# Patient Record
Sex: FEMALE | Race: BLACK OR AFRICAN AMERICAN
[De-identification: names, ages, dates, MRNs, and addresses within clinical notes are randomized per-mention and may not be internally consistent; named-entity substitution may affect disease eponyms.]

---

## 2019-07-26 ENCOUNTER — HOSPITAL ENCOUNTER (EMERGENCY)
Dept: HOSPITAL 54 - ER | Age: 34
Discharge: HOME | End: 2019-07-26
Payer: SELF-PAY

## 2019-07-26 VITALS — SYSTOLIC BLOOD PRESSURE: 133 MMHG | DIASTOLIC BLOOD PRESSURE: 85 MMHG

## 2019-07-26 VITALS — WEIGHT: 135 LBS | HEIGHT: 67 IN | BODY MASS INDEX: 21.19 KG/M2

## 2019-07-26 DIAGNOSIS — R41.82: ICD-10-CM

## 2019-07-26 DIAGNOSIS — F12.10: Primary | ICD-10-CM

## 2019-07-26 LAB
ALBUMIN SERPL BCP-MCNC: 3.9 G/DL (ref 3.4–5)
ALP SERPL-CCNC: 61 U/L (ref 46–116)
ALT SERPL W P-5'-P-CCNC: 12 U/L (ref 12–78)
APAP SERPL-MCNC: 2 UG/ML (ref 10–30)
AST SERPL W P-5'-P-CCNC: 10 U/L (ref 15–37)
BASOPHILS # BLD AUTO: 0.1 /CMM (ref 0–0.2)
BASOPHILS NFR BLD AUTO: 0.4 % (ref 0–2)
BILIRUB DIRECT SERPL-MCNC: 0.3 MG/DL (ref 0–0.2)
BILIRUB SERPL-MCNC: 1.1 MG/DL (ref 0.2–1)
BUN SERPL-MCNC: 6 MG/DL (ref 7–18)
CALCIUM SERPL-MCNC: 8.5 MG/DL (ref 8.5–10.1)
CHLORIDE SERPL-SCNC: 99 MMOL/L (ref 98–107)
CO2 SERPL-SCNC: 20 MMOL/L (ref 21–32)
CREAT SERPL-MCNC: 0.9 MG/DL (ref 0.6–1.3)
EOSINOPHIL NFR BLD AUTO: 1.5 % (ref 0–6)
ETHANOL SERPL-MCNC: < 3 MG/DL (ref 0–0)
GLUCOSE SERPL-MCNC: 171 MG/DL (ref 74–106)
HCT VFR BLD AUTO: 42 % (ref 33–45)
HGB BLD-MCNC: 14.5 G/DL (ref 11.5–14.8)
KETONES UR STRIP-MCNC: >=160 MG/DL
LYMPHOCYTES NFR BLD AUTO: 19.2 % (ref 20–44)
LYMPHOCYTES NFR BLD AUTO: 2.9 /CMM (ref 0.8–4.8)
MCHC RBC AUTO-ENTMCNC: 34 G/DL (ref 31–36)
MCV RBC AUTO: 86 FL (ref 82–100)
MONOCYTES NFR BLD AUTO: 0.9 /CMM (ref 0.1–1.3)
MONOCYTES NFR BLD AUTO: 5.9 % (ref 2–12)
NEUTROPHILS # BLD AUTO: 10.9 /CMM (ref 1.8–8.9)
NEUTROPHILS NFR BLD AUTO: 73 % (ref 43–81)
PH UR STRIP: 7 [PH] (ref 5–8)
PLATELET # BLD AUTO: 224 /CMM (ref 150–450)
POTASSIUM SERPL-SCNC: 3.1 MMOL/L (ref 3.5–5.1)
PROT SERPL-MCNC: 7.1 G/DL (ref 6.4–8.2)
RBC # BLD AUTO: 4.94 MIL/UL (ref 4–5.2)
RBC #/AREA URNS HPF: (no result) /HPF (ref 0–2)
SALICYLATES SERPL-MCNC: 0.6 MG/DL (ref 2.8–20)
SODIUM SERPL-SCNC: 134 MMOL/L (ref 136–145)
UROBILINOGEN UR STRIP-MCNC: 0.2 EU/DL
WBC #/AREA URNS HPF: (no result) /HPF (ref 0–3)
WBC NRBC COR # BLD AUTO: 14.9 K/UL (ref 4.3–11)

## 2019-07-26 PROCEDURE — 96374 THER/PROPH/DIAG INJ IV PUSH: CPT

## 2019-07-26 PROCEDURE — 80329 ANALGESICS NON-OPIOID 1 OR 2: CPT

## 2019-07-26 PROCEDURE — 96361 HYDRATE IV INFUSION ADD-ON: CPT

## 2019-07-26 PROCEDURE — G0480 DRUG TEST DEF 1-7 CLASSES: HCPCS

## 2019-07-26 PROCEDURE — 99284 EMERGENCY DEPT VISIT MOD MDM: CPT

## 2019-07-26 PROCEDURE — 80048 BASIC METABOLIC PNL TOTAL CA: CPT

## 2019-07-26 PROCEDURE — 81001 URINALYSIS AUTO W/SCOPE: CPT

## 2019-07-26 PROCEDURE — 87086 URINE CULTURE/COLONY COUNT: CPT

## 2019-07-26 PROCEDURE — 36415 COLL VENOUS BLD VENIPUNCTURE: CPT

## 2019-07-26 PROCEDURE — 70450 CT HEAD/BRAIN W/O DYE: CPT

## 2019-07-26 PROCEDURE — 80305 DRUG TEST PRSMV DIR OPT OBS: CPT

## 2019-07-26 PROCEDURE — 96375 TX/PRO/DX INJ NEW DRUG ADDON: CPT

## 2019-07-26 PROCEDURE — 80076 HEPATIC FUNCTION PANEL: CPT

## 2019-07-26 PROCEDURE — 96376 TX/PRO/DX INJ SAME DRUG ADON: CPT

## 2019-07-26 PROCEDURE — 80307 DRUG TEST PRSMV CHEM ANLYZR: CPT

## 2019-07-26 PROCEDURE — 84703 CHORIONIC GONADOTROPIN ASSAY: CPT

## 2019-07-26 PROCEDURE — 85025 COMPLETE CBC W/AUTO DIFF WBC: CPT

## 2019-07-26 NOTE — NUR
PT REMAINS VERY RESTLESS, TOSSING AROUND IN BED, ER MD MADE AWARE. WILL 
CONTINUE TO MONITOR PT CLOSELY.

## 2019-07-26 NOTE — NUR
PT REMAINS VERY RESTLESS, UNCOOPERATIVE. ER MD AT [facility] TO RE-EVAL PT WITH 
ORDERS RECEIVED. WILL CARRY OUT ORDERS.

## 2019-08-02 ENCOUNTER — HOSPITAL ENCOUNTER (EMERGENCY)
Dept: HOSPITAL 54 - ER | Age: 34
LOS: 1 days | Discharge: HOME | End: 2019-08-03
Payer: SELF-PAY

## 2019-08-02 VITALS — HEIGHT: 64 IN | BODY MASS INDEX: 23.39 KG/M2 | WEIGHT: 137 LBS

## 2019-08-02 DIAGNOSIS — R51: Primary | ICD-10-CM

## 2019-08-02 DIAGNOSIS — E87.6: ICD-10-CM

## 2019-08-02 DIAGNOSIS — R11.2: ICD-10-CM

## 2019-08-02 DIAGNOSIS — Z60.2: ICD-10-CM

## 2019-08-02 DIAGNOSIS — F17.200: ICD-10-CM

## 2019-08-02 LAB
BASOPHILS # BLD AUTO: 0 /CMM (ref 0–0.2)
BASOPHILS NFR BLD AUTO: 0.3 % (ref 0–2)
BUN SERPL-MCNC: 6 MG/DL (ref 7–18)
CALCIUM SERPL-MCNC: 8.7 MG/DL (ref 8.5–10.1)
CHLORIDE SERPL-SCNC: 102 MMOL/L (ref 98–107)
CO2 SERPL-SCNC: 23 MMOL/L (ref 21–32)
CREAT SERPL-MCNC: 0.8 MG/DL (ref 0.6–1.3)
EOSINOPHIL NFR BLD AUTO: 1.7 % (ref 0–6)
GLUCOSE SERPL-MCNC: 137 MG/DL (ref 74–106)
HCT VFR BLD AUTO: 44 % (ref 33–45)
HGB BLD-MCNC: 14.8 G/DL (ref 11.5–14.8)
KETONES UR STRIP-MCNC: 40 MG/DL
LYMPHOCYTES NFR BLD AUTO: 16.4 % (ref 20–44)
LYMPHOCYTES NFR BLD AUTO: 2.3 /CMM (ref 0.8–4.8)
MCHC RBC AUTO-ENTMCNC: 34 G/DL (ref 31–36)
MCV RBC AUTO: 86 FL (ref 82–100)
MONOCYTES NFR BLD AUTO: 0.6 /CMM (ref 0.1–1.3)
MONOCYTES NFR BLD AUTO: 4.1 % (ref 2–12)
NEUTROPHILS # BLD AUTO: 10.9 /CMM (ref 1.8–8.9)
NEUTROPHILS NFR BLD AUTO: 77.5 % (ref 43–81)
PH UR STRIP: 8.5 [PH] (ref 5–8)
PLATELET # BLD AUTO: 226 /CMM (ref 150–450)
POTASSIUM SERPL-SCNC: 2.8 MMOL/L (ref 3.5–5.1)
RBC # BLD AUTO: 5.07 MIL/UL (ref 4–5.2)
RBC #/AREA URNS HPF: (no result) /HPF (ref 0–2)
SODIUM SERPL-SCNC: 140 MMOL/L (ref 136–145)
UROBILINOGEN UR STRIP-MCNC: 0.2 EU/DL
WBC #/AREA URNS HPF: (no result) /HPF (ref 0–3)
WBC NRBC COR # BLD AUTO: 14 K/UL (ref 4.3–11)

## 2019-08-02 PROCEDURE — 85730 THROMBOPLASTIN TIME PARTIAL: CPT

## 2019-08-02 PROCEDURE — 99284 EMERGENCY DEPT VISIT MOD MDM: CPT

## 2019-08-02 PROCEDURE — 87186 SC STD MICRODIL/AGAR DIL: CPT

## 2019-08-02 PROCEDURE — 96374 THER/PROPH/DIAG INJ IV PUSH: CPT

## 2019-08-02 PROCEDURE — 81001 URINALYSIS AUTO W/SCOPE: CPT

## 2019-08-02 PROCEDURE — 80048 BASIC METABOLIC PNL TOTAL CA: CPT

## 2019-08-02 PROCEDURE — 84703 CHORIONIC GONADOTROPIN ASSAY: CPT

## 2019-08-02 PROCEDURE — 87086 URINE CULTURE/COLONY COUNT: CPT

## 2019-08-02 PROCEDURE — 85025 COMPLETE CBC W/AUTO DIFF WBC: CPT

## 2019-08-02 PROCEDURE — 96375 TX/PRO/DX INJ NEW DRUG ADDON: CPT

## 2019-08-02 PROCEDURE — 96361 HYDRATE IV INFUSION ADD-ON: CPT

## 2019-08-02 PROCEDURE — 36415 COLL VENOUS BLD VENIPUNCTURE: CPT

## 2019-08-02 SDOH — SOCIAL STABILITY - SOCIAL INSECURITY: PROBLEMS RELATED TO LIVING ALONE: Z60.2

## 2019-08-02 NOTE — NUR
BIB SELF. TO ER BED 11. AAOX4. BREATHING RAPID BUT NOT IN RESP DISTRESS. PT IS 
ANXIOUS AND CRYING. C/O THROBBING HEADACHE AT THE BACK OF THE HEAD SINCE 
TUESDAY. PT REPORTS GOING TO ER YESTERDAY BUT PAIN WENT BACK. CARIN MEADOWS AT 
BEDSIDE FOR EVAL AND ORDERS RECEIVED.

## 2019-08-03 VITALS — SYSTOLIC BLOOD PRESSURE: 124 MMHG | DIASTOLIC BLOOD PRESSURE: 88 MMHG

## 2019-08-03 NOTE — NUR
Patient discharged to home in stable condition. Written and verbal after care 
instructions given. Patient verbalizes understanding of instruction.IV removed. 
Catheter intact and site benign. Pressure and 4x4 applied to site. No bleeding 
noted.Pt ambulatory with a steady gait

## 2019-08-20 NOTE — NUR
pt got out of bed and started urinating on the floor, total pt care done. 
assisted pt back to bed. pt moved to bed 13 for close monitoring with sitter at 
bedside. Initiate Treatment: Apply econazole cream twice per day x 1 month Detail Level: Zone